# Patient Record
Sex: MALE | Race: WHITE | Employment: FULL TIME | URBAN - METROPOLITAN AREA
[De-identification: names, ages, dates, MRNs, and addresses within clinical notes are randomized per-mention and may not be internally consistent; named-entity substitution may affect disease eponyms.]

---

## 2017-01-19 PROCEDURE — 81001 URINALYSIS AUTO W/SCOPE: CPT | Performed by: INTERNAL MEDICINE

## 2018-03-27 PROCEDURE — 84403 ASSAY OF TOTAL TESTOSTERONE: CPT | Performed by: INTERNAL MEDICINE

## 2018-03-27 PROCEDURE — 81003 URINALYSIS AUTO W/O SCOPE: CPT | Performed by: INTERNAL MEDICINE

## 2019-05-01 PROCEDURE — 81001 URINALYSIS AUTO W/SCOPE: CPT | Performed by: INTERNAL MEDICINE

## 2019-10-09 PROBLEM — G47.33 OSA (OBSTRUCTIVE SLEEP APNEA): Status: ACTIVE | Noted: 2019-10-09

## 2024-05-14 ENCOUNTER — HOSPITAL ENCOUNTER (OUTPATIENT)
Age: 69
Discharge: HOME | End: 2024-05-14

## 2024-05-14 VITALS
SYSTOLIC BLOOD PRESSURE: 134 MMHG | OXYGEN SATURATION: 97 % | RESPIRATION RATE: 18 BRPM | HEART RATE: 65 BPM | DIASTOLIC BLOOD PRESSURE: 73 MMHG | TEMPERATURE: 98 F
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
## 2024-05-14 DIAGNOSIS — S61.451A DOG BITE, HAND, RIGHT, INITIAL ENCOUNTER: Primary | ICD-10-CM

## 2024-05-14 PROCEDURE — 99204 OFFICE O/P NEW MOD 45 MIN: CPT

## 2024-05-14 PROCEDURE — 99203 OFFICE O/P NEW LOW 30 MIN: CPT

## 2024-05-14 RX ORDER — AMOXICILLIN AND CLAVULANATE POTASSIUM 875; 125 MG/1; MG/1
1 TABLET, FILM COATED ORAL 2 TIMES DAILY
Qty: 10 TABLET | Refills: 0 | Status: SHIPPED | OUTPATIENT
Start: 2024-05-14 | End: 2024-05-19

## 2024-05-14 NOTE — ED INITIAL ASSESSMENT (HPI)
Patient arrives ambulatory with c/o dog bite to right second finger after accidentally startling his daughters dog  this morning. Last tdap 10/2023 per chart review.

## 2024-05-14 NOTE — DISCHARGE INSTRUCTIONS
Take the full course of antibiotics as prescribed.  You should be washing/cleaning the wound 3x daily with regular soap and water. Pat dry, apply antibiotic ointment, and be sure to keep the area clean and dry. You may shower as usual. You may take Tylenol or Motrin as needed for discomfort. Watch for signs of infection such as increased surrounding redness, swelling, drainage, fever. Follow-up with your primary care provider or the hand specialist for a wound check in the next few days. Return to the [facility] or seek care in the ER for new or worsening symptoms, fever.

## 2024-05-14 NOTE — ED PROVIDER NOTES
Patient Seen in: [facility]    History   CC: dog bite  HPI: Og Beckham 68 year old male  who presents c/o right hand dog bite sustained yesterday from his daughters dog who he is watching. Advises he accidentally startled the dog causing bite. Denies numbness, tingling, weakness or limitation in range of motion associated.  Last tetanus unknown.  Also has a scrape to the right side at the base.  Patient.  Denies pain or injury elsewhere associated.    Past Medical History:    ALLERGIC RHINITIS    HYPERLIPIDEMIA    RANJANA (obstructive sleep apnea)    AHI 15 RDI 21 REM AHI 22 Supine AHI 28 non-supine AHI 4.8 Sao2 Chirag 83%       Past Surgical History:   Procedure Laterality Date    Colonoscopy  2005    Colonoscopy: normal 2005     Other surgical history  1/29/13    Cystoscopy-Dr. Hurst    Tonsillectomy      Vasectomy         Family History   Problem Relation Age of Onset    Other (Other) Father         CHF    Cancer Mother          breast cancer     No Known Problems Sister     No Known Problems Brother        Social History     Socioeconomic History    Marital status:    Tobacco Use    Smoking status: Never    Smokeless tobacco: Never   Vaping Use    Vaping status: Never Used   Substance and Sexual Activity    Alcohol use: Yes     Alcohol/week: 4.0 standard drinks of alcohol     Types: 4 Standard drinks or equivalent per week     Comment: 3-4 days per week, 2 drinks per day    Drug use: No       ROS:  Review of Systems    Positive for stated complaint: dog bite  Other systems are as noted in HPI.  Constitutional and vital signs reviewed.      All other systems reviewed and negative except as noted above.    PSFH elements reviewed from today and agreed except as otherwise stated in HPI.             Constitutional and vital signs reviewed.        Physical Exam     ED Triage Vitals [05/14/24 0848]   /73   Pulse 65   Resp 18   Temp 97.7 °F (36.5 °C)   Temp src Temporal   SpO2 97 %   O2 Device None  (Room air)       Current:/73   Pulse 65   Temp 97.7 °F (36.5 °C) (Temporal)   Resp 18   SpO2 97%         PE:  General - Appears well, non-toxic and in NAD  Skin - +multiple small clustered puncture wounds noted to right hand overlying lateral MCP joint and dorsal hand overlying webbing btwn 2nd and 3rd fingers. +there is a 0.5cm \"V\" shaped laceration to the mccord 2nd mid-distal digit. No fb visualized or palpated. No surrounding erythema or edema. No currently bleeding. +superficial 4cm abrasion to right cheek. Skin otherwise pink warm and dry throughout, mmm, cap refill <2seconds  Neuro - A&O x4, sensation equal to both medial and lateral aspects of right hand digits extremities, steady gait  MSK - makes purposeful movements of all right hand digits with full ROM noted, finger flexion/extension strength equal bilat, radial pulses 2+ bilat.  No bony tenderness of the right hand or digits.  Psych - Interactive and appropriate      ED Course   Labs Reviewed - No data to display    MDM     DDx: Dog bite, cellulitis, fracture    Chart review shows last tetanus 2023.  General dog bite instructions, wound care, topical antibiotic ointment, prophylactic Augmentin, follow-up and return/ED precautions reviewed.  Patient is historian and demonstrates understanding of all instruction and agrees with plan of care.      Disposition and Plan     Clinical Impression:  1. Dog bite, hand, right, initial encounter        Disposition:  Discharge    Follow-up:  Krunal Loza MD  [address] [facility] [address]  [phone]    Go in 3 days        Medications Prescribed:  Current Discharge Medication List        START taking these medications    Details   amoxicillin clavulanate 875-125 MG Oral Tab Take 1 tablet by mouth 2 (two) times daily for 5 days.  Qty: 10 tablet, Refills: 0